# Patient Record
Sex: FEMALE | Race: WHITE
[De-identification: names, ages, dates, MRNs, and addresses within clinical notes are randomized per-mention and may not be internally consistent; named-entity substitution may affect disease eponyms.]

---

## 2017-12-24 ENCOUNTER — HEALTH MAINTENANCE LETTER (OUTPATIENT)
Age: 62
End: 2017-12-24

## 2019-03-06 ENCOUNTER — HOSPITAL ENCOUNTER (OUTPATIENT)
Dept: PHYSICAL THERAPY | Facility: CLINIC | Age: 64
End: 2019-03-06
Payer: COMMERCIAL

## 2019-03-06 DIAGNOSIS — F07.81 POST CONCUSSION SYNDROME: ICD-10-CM

## 2019-03-06 DIAGNOSIS — R42 DIZZINESS: Primary | ICD-10-CM

## 2019-03-06 PROCEDURE — 97162 PT EVAL MOD COMPLEX 30 MIN: CPT | Mod: GP | Performed by: PHYSICAL THERAPIST

## 2019-03-06 PROCEDURE — 97112 NEUROMUSCULAR REEDUCATION: CPT | Mod: GP | Performed by: PHYSICAL THERAPIST
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
## 2019-03-11 ENCOUNTER — HOSPITAL ENCOUNTER (OUTPATIENT)
Dept: PHYSICAL THERAPY | Facility: CLINIC | Age: 64
End: 2019-03-11
Payer: COMMERCIAL

## 2019-03-11 DIAGNOSIS — F07.81 POST CONCUSSION SYNDROME: ICD-10-CM

## 2019-03-11 DIAGNOSIS — R42 DIZZINESS: Primary | ICD-10-CM

## 2019-03-11 PROCEDURE — 97112 NEUROMUSCULAR REEDUCATION: CPT | Mod: GP | Performed by: PHYSICAL THERAPIST

## 2019-03-11 PROCEDURE — 97110 THERAPEUTIC EXERCISES: CPT | Mod: GP | Performed by: PHYSICAL THERAPIST

## 2019-03-22 ENCOUNTER — HOSPITAL ENCOUNTER (OUTPATIENT)
Dept: PHYSICAL THERAPY | Facility: CLINIC | Age: 64
End: 2019-03-22
Payer: COMMERCIAL

## 2019-03-22 DIAGNOSIS — R42 DIZZINESS: Primary | ICD-10-CM

## 2019-03-22 DIAGNOSIS — F07.81 POST CONCUSSION SYNDROME: ICD-10-CM

## 2019-03-22 PROCEDURE — 97112 NEUROMUSCULAR REEDUCATION: CPT | Mod: GP | Performed by: PHYSICAL THERAPIST

## 2019-04-08 ENCOUNTER — HOSPITAL ENCOUNTER (OUTPATIENT)
Dept: PHYSICAL THERAPY | Facility: CLINIC | Age: 64
End: 2019-04-08
Payer: COMMERCIAL

## 2019-04-08 DIAGNOSIS — F07.81 POST CONCUSSION SYNDROME: ICD-10-CM

## 2019-04-08 DIAGNOSIS — R42 DIZZINESS: Primary | ICD-10-CM

## 2019-04-08 PROCEDURE — 97112 NEUROMUSCULAR REEDUCATION: CPT | Mod: GP | Performed by: PHYSICAL THERAPIST

## 2019-05-29 ENCOUNTER — HOSPITAL ENCOUNTER (OUTPATIENT)
Dept: PHYSICAL THERAPY | Facility: CLINIC | Age: 64
End: 2019-05-29
Payer: COMMERCIAL

## 2019-05-29 DIAGNOSIS — R42 DIZZINESS: Primary | ICD-10-CM

## 2019-05-29 DIAGNOSIS — F07.81 POST CONCUSSION SYNDROME: ICD-10-CM

## 2019-05-29 PROCEDURE — 97112 NEUROMUSCULAR REEDUCATION: CPT | Mod: GP | Performed by: PHYSICAL THERAPIST

## 2019-05-29 NOTE — PROGRESS NOTES
Outpatient Physical Therapy Discharge Note     Patient: Swetha Medina  : 1955    Beginning/End Dates of Reporting Period:  3/6/2019 to 2019; total of 5 visits     Referring Provider: Rosetta Jeong APRN, CNP    Therapy Diagnosis: Decreased safety with functional mobility     Client Self Report: Patient arriving today and states that she is doing well.  Patient reporting no issues with balance or dizziness.  Patient continues to report some neck pain (goes to chiropractor) and sensitivity to light and noise; slowly getting better.  Patient reports that she was able to go for a walk on  and is able to shop without issues.  Patient reporting that she is ready to be discharged from therapy.    Objective Measurements:  Objective Measure: CSA score  Details: 11(76 at eval)  Objective Measure: 25' walk  Details: Normal: 6.48 seconds, Head turns: 6.89 seconds, Cognitive challenge: 8.83 seconds(N: 6.10, HT: 8.70, C: 10.58 at eval)     Outcome Measures (most recent score):  Concussion Symptom Assessment (score out of 90). A higher score indicates greater impairment: 11    Goals:  Goal Identifier NOT MET: 25' Walk   Goal Description The patient will demonstrate consistent times with performance of normal 25' walk, 25' walk with HT, and 25' walk with cognitive challenge to demonstrate improved motor function with dual tasking and divided attention tasks to facilitate return to baseline level of function and engagement in motor task with added complexities.   Target Date 19   Date Met      Progress:     Goal Identifier NOT MET: CSA   Goal Description The patient will score 10/90 or less on CSA to demonstrate a significant improvement in concussion symptom severity and utilization of management techniques.   Target Date 19   Date Met      Progress:     Goal Identifier NOT MET: DVA   Goal Description The patient will score within 2 lines of SVA with performance of DVA to demonstrate that her  vestibular system is functioning optimally and is able to support gaze stability within a functional range.   Target Date 05/04/19   Date Met      Progress: at eval 10 static acuity, 7 at 1 Hz and 3 at 2 Hz with elevation of symptoms; today static acuity 10, 8 1 Hz and 6 at 2 Hz with very brief dizziness     Progress Toward Goals: Patient has demonstrated significant progress towards above goals and is close to meeting them, see above objective measures.  Patient reports that she has been able to resume her prior level of function and has not experienced any dizziness and has no concerns regarding her balance.  Scoring on DVA indicating limitations with functional gaze stability which was discussed with patient; patient wanting to continue with vestibular exercises independently and expressed desire to discharge from physical therapy at this time.    Plan:  Discharge from therapy.    Discharge:    Reason for Discharge: Patient chooses to discontinue therapy.    Equipment Issued: N/A    Discharge Plan: Patient to continue home program.

## 2024-12-28 VITALS
TEMPERATURE: 101.9 F | BODY MASS INDEX: 24.12 KG/M2 | SYSTOLIC BLOOD PRESSURE: 113 MMHG | WEIGHT: 134 LBS | RESPIRATION RATE: 18 BRPM | OXYGEN SATURATION: 93 % | DIASTOLIC BLOOD PRESSURE: 79 MMHG | HEART RATE: 81 BPM

## 2024-12-28 PROCEDURE — 250N000013 HC RX MED GY IP 250 OP 250 PS 637: Performed by: EMERGENCY MEDICINE

## 2024-12-28 PROCEDURE — 87637 SARSCOV2&INF A&B&RSV AMP PRB: CPT | Performed by: EMERGENCY MEDICINE

## 2024-12-28 PROCEDURE — 99281 EMR DPT VST MAYX REQ PHY/QHP: CPT

## 2024-12-28 RX ORDER — IBUPROFEN 600 MG/1
600 TABLET, FILM COATED ORAL ONCE
Status: COMPLETED | OUTPATIENT
Start: 2024-12-28 | End: 2024-12-28

## 2024-12-28 RX ADMIN — IBUPROFEN 600 MG: 600 TABLET, FILM COATED ORAL at 23:45

## 2024-12-28 ASSESSMENT — COLUMBIA-SUICIDE SEVERITY RATING SCALE - C-SSRS
2. HAVE YOU ACTUALLY HAD ANY THOUGHTS OF KILLING YOURSELF IN THE PAST MONTH?: NO
6. HAVE YOU EVER DONE ANYTHING, STARTED TO DO ANYTHING, OR PREPARED TO DO ANYTHING TO END YOUR LIFE?: NO
1. IN THE PAST MONTH, HAVE YOU WISHED YOU WERE DEAD OR WISHED YOU COULD GO TO SLEEP AND NOT WAKE UP?: NO

## 2024-12-29 ENCOUNTER — HOSPITAL ENCOUNTER (EMERGENCY)
Facility: CLINIC | Age: 69
Discharge: LEFT BEFORE DISCHARGE | End: 2024-12-29
Admitting: EMERGENCY MEDICINE
Payer: COMMERCIAL

## 2024-12-29 LAB
FLUAV RNA SPEC QL NAA+PROBE: POSITIVE
FLUBV RNA RESP QL NAA+PROBE: NEGATIVE
RSV RNA SPEC NAA+PROBE: NEGATIVE
SARS-COV-2 RNA RESP QL NAA+PROBE: NEGATIVE
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
